# Patient Record
Sex: MALE | Race: WHITE | Employment: FULL TIME | URBAN - METROPOLITAN AREA
[De-identification: names, ages, dates, MRNs, and addresses within clinical notes are randomized per-mention and may not be internally consistent; named-entity substitution may affect disease eponyms.]

---

## 2019-05-24 ENCOUNTER — CONSULT (OUTPATIENT)
Dept: CARDIOLOGY | Facility: CLINIC | Age: 49
End: 2019-05-24

## 2019-05-24 VITALS
OXYGEN SATURATION: 98 % | WEIGHT: 173 LBS | BODY MASS INDEX: 22.93 KG/M2 | HEIGHT: 73 IN | HEART RATE: 75 BPM | SYSTOLIC BLOOD PRESSURE: 110 MMHG | DIASTOLIC BLOOD PRESSURE: 76 MMHG

## 2019-05-24 DIAGNOSIS — I49.1 PAC (PREMATURE ATRIAL CONTRACTION): ICD-10-CM

## 2019-05-24 DIAGNOSIS — I47.1 PAROXYSMAL SVT (SUPRAVENTRICULAR TACHYCARDIA) (HCC): Primary | ICD-10-CM

## 2019-05-24 PROCEDURE — 99203 OFFICE O/P NEW LOW 30 MIN: CPT | Performed by: PHYSICIAN ASSISTANT

## 2019-05-24 PROCEDURE — 93000 ELECTROCARDIOGRAM COMPLETE: CPT | Performed by: PHYSICIAN ASSISTANT

## 2019-05-24 NOTE — PROGRESS NOTES
"[facility] at [facility]  INITIAL OFFICE CONSULT      Kristian Mancia  1970  PCP: Leesa Couch MD    SUBJECTIVE:   Kristian Mancia is a 48 y.o. male seen for a consultation visit regarding the following:     Chief Complaint:   Chief Complaint   Patient presents with   • Irregular Heart Beat   • Palpitations          Consultation is requested by Self Referring for evaluation of Irregular Heart Beat and Palpitations        History:  Mr. mancia is a pleasant 48-year-old gentleman presents today for consultation regarding recurrent palpitations.  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  He has had a known remote episode of SVT and successful AVNRT ablation in [place] and DNR/DNI 6.  In 2003 when he was defending his thesis for his doctor he was under tremendous amount of stress he had recurrent episodes of palpitations.  He is described more as a skipping beat no rapid episodes ever lasting more than just a few seconds.  A work-up at this time in [place] was completed including a Holter monitor and an echocardiogram.  There is evidence of PACs and PVCs and echo revealed normal heart structure.  He states he seemed to calm down his stress resolved.  More recently over the past 6 months he had recurrent palpitations considered moderate in nature.  He states they happen about once a week he will have a few in a row where he feels heartbeat is regular just lasting a few seconds resolved on.  One time he had episode of associate with some mild dizziness.  Otherwise he denies any other concomitant symptoms such as nausea chest pain diaphoresis or heart failure symptoms.  His wife was concerned about and therefore she prompted him to get \"checked out\".  He did go to primary care  and had lab work done he is not heard back but was expecting to hear whether his labs were acceptable.  Otherwise he does admit with recent " stress he is thinks some of these symptoms are recurring because he is working late hours drinking more caffeine and alcohol than usual.  He does not exercise but denies any chest pain or chest tightness with exertion suggesting angina pectoris.  He denies any heart failure symptoms such as orthopnea PND or peripheral edema.      Cardiac PMH: (Old records have been reviewed and summarized below)  1. 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  Afib induced during EPS.    B)Echocardiogram 2003, bowing of Mitral valve, No MVP   C)Recurrent Palpitations with work up in [place] 2003. Holter PAC's.  Echo Normal EF, No VHD.   2. Chronic RBBB  3. PAC's, PVC's by remote Monitors.             Past Medical History, Past Surgical History, Family history, Social History, and Medications were all reviewed with the patient today and updated as necessary.     No current outpatient medications on file.     No current facility-administered medications for this visit.      No Known Allergies      Past Medical History:   Diagnosis Date   • Atrial fibrillation (CMS/HCC)    • Chicken pox    • Hepatitis      Past Surgical History:   Procedure Laterality Date   • ABLATION OF DYSRHYTHMIC FOCUS       Family History   Problem Relation Age of Onset   • No Known Problems Mother    • No Known Problems Father    • No Known Problems Brother      Social History     Tobacco Use   • Smoking status: Never Smoker   • Smokeless tobacco: Never Used   Substance Use Topics   • Alcohol use: Yes     Comment: 1-2 DRINKS DAILY       ROS:  Review of Symptoms:  General: no recent weight loss/gain, weakness or fatigue  Skin: no rashes, lumps, or other skin changes  HEENT: no dizziness, lightheadedness, or vision changes  Respiratory: no cough or hemoptysis  Cardiovascular: + palpitations, and tachycardia  Gastrointestinal: no black/tarry stools or diarrhea  Urinary: no change in frequency or urgency  Peripheral Vascular: no claudication or leg cramps  Musculoskeletal: no  "muscle or joint pain/stiffness  Psychiatric: + Anxiety  Neurological: no sensory or motor loss, no syncope  Hematologic: no anemia, easy bruising or bleeding  Endocrine: no thyroid problems, nor heat or cold intolerance         PHYSICAL EXAM:   /76 (BP Location: Left arm, Patient Position: Sitting)   Pulse 75   Ht 185.4 cm (73\")   Wt 78.5 kg (173 lb)   SpO2 98%   BMI 22.82 kg/m²      Wt Readings from Last 5 Encounters:   05/24/19 78.5 kg (173 lb)     BP Readings from Last 5 Encounters:   05/24/19 110/76       General-Well Nourished, Well developed  Eyes - PERRLA  Neck- supple, No mass  CV- regular rate and rhythm, no MRG  Lung- clear bilaterally  Abd- soft, +BS  Musc/skel - Norm strength and range of motion  Skin- warm and dry  Neuro - Alert & Oriented x 3, appropriate mood.    Medical problems and test results were reviewed with the patient today.     No results found for this or any previous visit.      No results found for: CHOL, HDL, HDLC, LDL, LDLC, VLDL    EKG:  (EKG/Tracing has been independently visualized by me and summarized below)      ECG 12 Lead  Date/Time: 5/24/2019 10:24 AM  Performed by: Nathan Campbell PA  Authorized by: Nathan Campbell PA   Comparison: compared with previous ECG from 1/19/2011  Similar to previous ECG  Rhythm: sinus rhythm  Rate: normal  Conduction: incomplete right bundle branch block  ST Segments: ST segments normal  QRS axis: normal  Other: no other findings    Clinical impression: normal ECG          ASSESSMENT   1. Palpitations:  Hx of PAC's by remote monitor.  Symptoms suggest recurrent PAC's, PVC's.  We offered the patient option of trying a low-dose beta-blocker to see if this improves his symptoms although he feels that these are not overly burdensome he more or less just wants to have a reevaluation to determine if they are again possibly just infrequent PACs and PVCs.  We did discuss with him that he needs to reduce his caffeine and alcohol intake " increase his sleep this may again resolve the symptoms that had in the remote past.  2. SVT, Remote EPS and RFA of AVNRT 1996.   3. Anxiety.       PLAN  · Cardiokey Monitor  · Review Labs from [facility]   · Limit stress, Limit Caffeine intake  · We offered medications such as BB, patient declined at this time.  He will call if his symptoms progress in nature and he feels any worse than he may consider medication at that time.  Follow up one month.         Cardiology/Electrophysiology  05/24/19  10:21 AM  Will Shannan BONILLA

## 2019-06-25 ENCOUNTER — OFFICE VISIT (OUTPATIENT)
Dept: CARDIOLOGY | Facility: CLINIC | Age: 49
End: 2019-06-25

## 2019-06-25 VITALS
BODY MASS INDEX: 23.72 KG/M2 | HEART RATE: 81 BPM | HEIGHT: 73 IN | WEIGHT: 179 LBS | SYSTOLIC BLOOD PRESSURE: 108 MMHG | DIASTOLIC BLOOD PRESSURE: 64 MMHG

## 2019-06-25 DIAGNOSIS — I47.1 PAROXYSMAL SVT (SUPRAVENTRICULAR TACHYCARDIA) (HCC): Primary | ICD-10-CM

## 2019-06-25 DIAGNOSIS — I49.1 PAC (PREMATURE ATRIAL CONTRACTION): ICD-10-CM

## 2019-06-25 PROCEDURE — 99213 OFFICE O/P EST LOW 20 MIN: CPT | Performed by: PHYSICIAN ASSISTANT

## 2019-06-25 NOTE — PROGRESS NOTES
[facility] at [facility]   OFFICE NOTE      Kristian Mancia  1970  PCP: Leesa Couch MD    SUBJECTIVE:   Kristian Mancia is a 48 y.o. 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  He states that while wearing the monitor he did have a few episodes.  He states however with some changes in lifestyle including reducing alcohol and caffeine intake he has had fewer symptoms.  He denies any chest pain or chest tightness with exertion.  Denies any dizzy near syncope or syncope episodes.  Denies any heart failure symptoms.      ROS:  Review of Symptoms:  General: no recent weight loss/gain, weakness or fatigue  Skin: no rashes, lumps, or other skin changes  HEENT: no dizziness, lightheadedness, or vision changes  Respiratory: no cough or hemoptysis  Cardiovascular: Rare palpitations, and tachycardia  Gastrointestinal: no black/tarry stools or diarrhea  Urinary: no change in frequency or urgency  Peripheral Vascular: no claudication or leg cramps  Musculoskeletal: no muscle or joint pain/stiffness  Psychiatric: no depression or excessive stress  Neurological: no sensory or motor loss, no syncope  Hematologic: no anemia, easy bruising or bleeding  Endocrine: no thyroid problems, nor heat or cold intolerance    Cardiac PMH: (Old records have been reviewed and summarized below)  1. 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  Afib induced during EPS.               B)Echocardiogram 2003, bowing of Mitral valve, No MVP              C)Recurrent Palpitations with work up in [place] 2003. Holter PAC's.  Echo Normal EF, No VHD.   2. Chronic RBBB  3. PAC's, PVC's by remote Monitors.         Past Medical History, Past Surgical History, Family history, Social History, and Medications were all reviewed with the patient  "today and updated as necessary.     No current outpatient medications on file.      No Known Allergies  Patient Active Problem List   Diagnosis   • Paroxysmal SVT (supraventricular tachycardia) (CMS/HCC)   • PAC (premature atrial contraction)     Past Medical History:   Diagnosis Date   • Atrial fibrillation (CMS/HCC)    • Chicken pox    • Hepatitis      Past Surgical History:   Procedure Laterality Date   • ABLATION OF DYSRHYTHMIC FOCUS       Family History   Problem Relation Age of Onset   • No Known Problems Mother    • No Known Problems Father    • No Known Problems Brother      Social History     Tobacco Use   • Smoking status: Never Smoker   • Smokeless tobacco: Never Used   Substance Use Topics   • Alcohol use: Yes     Comment: 1-2 DRINKS DAILY         PHYSICAL EXAM:    /64 (BP Location: Left arm, Patient Position: Sitting)   Pulse 81   Ht 185.4 cm (73\")   Wt 81.2 kg (179 lb)   BMI 23.62 kg/m²        Wt Readings from Last 5 Encounters:   06/25/19 81.2 kg (179 lb)   05/24/19 78.5 kg (173 lb)       BP Readings from Last 5 Encounters:   06/25/19 108/64   05/24/19 110/76       General appearance - Alert, well appearing, and in no distress   Mental status - Affect appropriate to mood.  Eyes - Sclerae anicteric,  ENMT - Hearing grossly normal bilaterally, Dental hygiene good.  Neck - Carotids upstroke normal bilaterally, no bruits, no JVD.  Resp - Clear to auscultation, no wheezes, rales or rhonchi, symmetric air entry.  Heart - Normal rate, regular rhythm, normal S1, S2, no murmurs, rubs, clicks or gallops.  GI - Soft, nontender, nondistended, no masses or organomegaly.  Neurological - Grossly intact - normal speech, no focal findings  Musculoskeletal - No joint tenderness, deformity or swelling, no muscular tenderness noted.  Extremities - Peripheral pulses normal, no pedal edema, no clubbing or cyanosis.  Skin - Normal coloration and turgor.  Psych -  oriented to person, place, and time.    Medical " problems and test results were reviewed with the patient today.     No results found for this or any previous visit (from the past 672 hour(s)).      ASSESSMENT   1. Palpitations:  Symptoms imprved with Lifestyle changes, reduced stress and Caffeine.  Zio Monitor occasional episodes of Atrial tachycardia and rare Pac's   2. Chronic RBBB  3. Remote AVNRT RFA 1996.     PLAN  · Continue medical therapy  · Return in one year or sooner if any changes in symptoms.     6/25/2019  10:18 AM    Will Shannan BONILLA